# Patient Record
Sex: FEMALE | Race: WHITE | NOT HISPANIC OR LATINO
[De-identification: names, ages, dates, MRNs, and addresses within clinical notes are randomized per-mention and may not be internally consistent; named-entity substitution may affect disease eponyms.]

---

## 2018-11-15 ENCOUNTER — TRANSCRIPTION ENCOUNTER (OUTPATIENT)
Age: 63
End: 2018-11-15

## 2020-01-03 ENCOUNTER — TRANSCRIPTION ENCOUNTER (OUTPATIENT)
Age: 65
End: 2020-01-03

## 2020-01-24 ENCOUNTER — TRANSCRIPTION ENCOUNTER (OUTPATIENT)
Age: 65
End: 2020-01-24

## 2024-02-07 PROBLEM — Z00.00 ENCOUNTER FOR PREVENTIVE HEALTH EXAMINATION: Status: ACTIVE | Noted: 2024-02-07

## 2024-02-08 ENCOUNTER — APPOINTMENT (OUTPATIENT)
Dept: ORTHOPEDIC SURGERY | Facility: CLINIC | Age: 69
End: 2024-02-08
Payer: COMMERCIAL

## 2024-02-08 VITALS — BODY MASS INDEX: 33.38 KG/M2 | HEIGHT: 60 IN | WEIGHT: 170 LBS

## 2024-02-08 DIAGNOSIS — J45.909 UNSPECIFIED ASTHMA, UNCOMPLICATED: ICD-10-CM

## 2024-02-08 DIAGNOSIS — E78.00 PURE HYPERCHOLESTEROLEMIA, UNSPECIFIED: ICD-10-CM

## 2024-02-08 DIAGNOSIS — I10 ESSENTIAL (PRIMARY) HYPERTENSION: ICD-10-CM

## 2024-02-08 DIAGNOSIS — E11.9 TYPE 2 DIABETES MELLITUS W/OUT COMPLICATIONS: ICD-10-CM

## 2024-02-08 PROCEDURE — 99203 OFFICE O/P NEW LOW 30 MIN: CPT | Mod: 25

## 2024-02-08 PROCEDURE — 20610 DRAIN/INJ JOINT/BURSA W/O US: CPT | Mod: RT

## 2024-02-08 PROCEDURE — 73010 X-RAY EXAM OF SHOULDER BLADE: CPT | Mod: RT

## 2024-02-08 PROCEDURE — 73030 X-RAY EXAM OF SHOULDER: CPT | Mod: RT

## 2024-02-08 RX ORDER — LORATADINE 10 MG/1
TABLET ORAL
Refills: 0 | Status: ACTIVE | COMMUNITY

## 2024-02-08 RX ORDER — ROSUVASTATIN CALCIUM 5 MG/1
TABLET, FILM COATED ORAL
Refills: 0 | Status: ACTIVE | COMMUNITY

## 2024-02-12 NOTE — HISTORY OF PRESENT ILLNESS
[Radiating] : radiating [Sharp] : sharp [] : yes [Constant] : constant [Nothing helps with pain getting better] : Nothing helps with pain getting better [FreeTextEntry5] : 69 y/o F presents for NP eval of the Rt. shldr today. Pt reports of fall sustained a few weeks ago onto her Rt. side. No prior tx. Denies any N/T.  [FreeTextEntry7] : down Rt. arm

## 2024-02-12 NOTE — PROCEDURE
[Large Joint Injection] : Large joint injection [Right] : of the right [Subacromial Space] : subacromial space [Pain] : pain [Inflammation] : inflammation [Alcohol] : alcohol [Betadine] : betadine [Ethyl Chloride sprayed topically] : ethyl chloride sprayed topically [Sterile technique used] : sterile technique used [___ cc    1%] : Lidocaine ~Vcc of 1%  [___ cc    0.25%] : Bupivacaine (Marcaine) ~Vcc of 0.25%  [___ cc    40mg] : Triamcinolone (Kenalog) ~Vcc of 40 mg  [] : Patient tolerated procedure well [Call if redness, pain or fever occur] : call if redness, pain or fever occur [Previous OTC use and PT nontherapeutic] : patient has tried OTC's including aspirin, Ibuprofen, Aleve, etc or prescription NSAIDS, and/or exercises at home and/or physical therapy without satisfactory response [Patient had decreased mobility in the joint] : patient had decreased mobility in the joint [Risks, benefits, alternatives discussed / Verbal consent obtained] : the risks benefits, and alternatives have been discussed, and verbal consent was obtained

## 2024-02-12 NOTE — ASSESSMENT
[FreeTextEntry1] : The patient is a 67 yo woman presenting with right shoulder pain s/p a fall into bushes 2 weeks ago. She reports checking on the home of a neighbor when she fell directly onto her right shoulder. Since that time she has had both weakness with ADLs and shoulder height motions. She has weakness with reaching and lifting overhead. She has pain to the anterolateral shoulder. She denies paresthesias. She has had no treatment. She is RHD.  Right shoulder exam: The patient presents with no apparent distress. Neurovascularly intact. Sensation intact to right upper extremity. No scars, rashes, or abrasions. 2+ pulses to the distal radius.Tender to palpation at anterolateral shoulder. AROM  ABD 80 ER 50 IR L1. 3/5 RC strength. 5/5 Deltoid strength. +Neer. +Kearns.   Right shoulder xrays taken today in office, 5 views WB: No GHOA. No AC OA. GH join is reduced. No fractures or loose bodies. Type II acromion. No obvious tumors, masses, or lesions.  The patient received a subacromial CSI. She tolerated it well. She will go for MRI to r/o RCT as well. She will call once MRI is completed.

## 2024-04-16 ENCOUNTER — RESULT REVIEW (OUTPATIENT)
Age: 69
End: 2024-04-16

## 2024-05-09 ENCOUNTER — APPOINTMENT (OUTPATIENT)
Dept: ORTHOPEDIC SURGERY | Facility: CLINIC | Age: 69
End: 2024-05-09
Payer: COMMERCIAL

## 2024-05-09 DIAGNOSIS — M25.511 PAIN IN RIGHT SHOULDER: ICD-10-CM

## 2024-05-09 DIAGNOSIS — M75.41 IMPINGEMENT SYNDROME OF RIGHT SHOULDER: ICD-10-CM

## 2024-05-09 PROCEDURE — 99213 OFFICE O/P EST LOW 20 MIN: CPT | Mod: 25

## 2024-05-09 PROCEDURE — 20610 DRAIN/INJ JOINT/BURSA W/O US: CPT | Mod: RT

## 2024-05-11 PROBLEM — M25.511 RIGHT SHOULDER PAIN, UNSPECIFIED CHRONICITY: Status: ACTIVE | Noted: 2024-02-08

## 2024-05-11 PROBLEM — M75.41 IMPINGEMENT SYNDROME OF RIGHT SHOULDER: Status: ACTIVE | Noted: 2024-02-12

## 2024-05-11 NOTE — HISTORY OF PRESENT ILLNESS
[Radiating] : radiating [Sharp] : sharp [] : yes [Constant] : constant [Nothing helps with pain getting better] : Nothing helps with pain getting better [FreeTextEntry5] : 69 y/o F presents for FU eval of the Rt. shldr today. Previous tx CSI [FreeTextEntry7] : down Rt. arm

## 2024-05-11 NOTE — ASSESSMENT
[FreeTextEntry1] : The patient returns for right shoulder pain. She had excellent relief from her previous CSI. Her pain has returned with ADLs and regular use. Since that time she has had both weakness with ADLs and shoulder height motions. She has weakness with reaching and lifting overhead. She has pain to the anterolateral shoulder. She denies paresthesias. She has had no treatment. She is RHD.  Right shoulder exam: The patient presents with no apparent distress. Neurovascularly intact. Sensation intact to right upper extremity. No scars, rashes, or abrasions. 2+ pulses to the distal radius.Tender to palpation at anterolateral shoulder. AROM  ABD 80 ER 50 IR L1. 3/5 RC strength. 5/5 Deltoid strength. +Neer. +Kearns.   The patient received a second CSI to the subacromial space of her right shoulder. She tolerated it well. She will return as needed.

## 2024-08-01 ENCOUNTER — APPOINTMENT (OUTPATIENT)
Dept: ORTHOPEDIC SURGERY | Facility: CLINIC | Age: 69
End: 2024-08-01

## 2025-07-17 ENCOUNTER — NON-APPOINTMENT (OUTPATIENT)
Age: 70
End: 2025-07-17